# Patient Record
(demographics unavailable — no encounter records)

---

## 2021-02-01 NOTE — RAD
EXAM: 

CHEST ONE VIEW



HISTORY:

Dyspnea and shortness of breath. Low oxygen saturation. Patient diagnosed with Covid pneumonia



COMPARISON:

1/31/2021



FINDINGS:

Cardiac silhouette is magnified by projection. Patchy parenchymal airspace opacities are seen in the 
perihilar regions bilaterally mildly increased at each lung base compared to prior exam but mildly

improved in the right upper lung zone. No pleural effusion is seen. Postoperative changes lower cervi
grace spine are again seen.



IMPRESSION:

Covid pneumonia with mild improvement in parenchymal opacity in the right upper lung zone with slight
 increase in patchy airspace opacities at each lung base.



Reported By: Noman Cross 

Electronically Signed:  2/1/2021 10:07 PM

## 2021-02-01 NOTE — CT
CT ANGIOGRAM THORAX WITH IV CONTRAST AND 3-D RECONSTRUCTIONS



CLINICAL INDICATION:

Dyspnea. Acute worsening shortness of breath. Covid pneumonia. 



COMPARISON:

None



FINDINGS:

Pulmonary arteries: No filling defects are seen in the central or segmental pulmonary arteries to sug
gest a pulmonary embolus.



Aorta: The aorta is normal in caliber without evidence of an aortic dissection.



Lungs: Diffuse scattered groundglass densities are seen throughout the lungs bilaterally in a pattern
 typical for Covid pneumonia. No pleural effusion is evident.



Mediastinum: Few mildly prominent mediastinal and hilar lymph nodes are seen which are nonspecific bu
t likely related to mild reactive lymphadenopathy.



Thyroid gland: Normal CT appearance.



Osseous structures: Postoperative changes lower cervical spine are seen.



Chest wall: No abnormality visualized.



Upper abdomen: Mild diminished attenuation of the liver relative to the spleen suggesting mild hepati
c steatosis. 



IMPRESSION:



1. No CT evidence of a pulmonary embolus.

2. Covid pneumonia.

3. Hepatic steatosis.



Reported By: Noman Cross 

Electronically Signed:  2/1/2021 10:49 PM